# Patient Record
Sex: FEMALE | Race: WHITE
[De-identification: names, ages, dates, MRNs, and addresses within clinical notes are randomized per-mention and may not be internally consistent; named-entity substitution may affect disease eponyms.]

---

## 2020-12-11 ENCOUNTER — HOSPITAL ENCOUNTER (EMERGENCY)
Dept: HOSPITAL 72 - EMR | Age: 80
Discharge: HOME | End: 2020-12-11
Payer: COMMERCIAL

## 2020-12-11 VITALS — SYSTOLIC BLOOD PRESSURE: 152 MMHG | DIASTOLIC BLOOD PRESSURE: 71 MMHG

## 2020-12-11 VITALS — BODY MASS INDEX: 24.55 KG/M2 | HEIGHT: 61 IN | WEIGHT: 130 LBS

## 2020-12-11 VITALS — DIASTOLIC BLOOD PRESSURE: 75 MMHG | SYSTOLIC BLOOD PRESSURE: 145 MMHG

## 2020-12-11 DIAGNOSIS — M54.5: ICD-10-CM

## 2020-12-11 DIAGNOSIS — G89.29: ICD-10-CM

## 2020-12-11 DIAGNOSIS — J44.9: ICD-10-CM

## 2020-12-11 DIAGNOSIS — M51.36: ICD-10-CM

## 2020-12-11 DIAGNOSIS — I11.9: ICD-10-CM

## 2020-12-11 DIAGNOSIS — Z85.9: ICD-10-CM

## 2020-12-11 DIAGNOSIS — S22.080A: Primary | ICD-10-CM

## 2020-12-11 PROCEDURE — 99284 EMERGENCY DEPT VISIT MOD MDM: CPT

## 2020-12-11 PROCEDURE — 72131 CT LUMBAR SPINE W/O DYE: CPT

## 2020-12-11 NOTE — EMERGENCY ROOM REPORT
History of Present Illness


General


Chief Complaint:  Back Pain-No Injury





Present Illness


HPI


80-year-old female with history of lumbar compression fractures presents to the 

emergency department complaining of 10 out of 10 severity low back pain that 

originates in the middle of her lower back progressive since yesterday.  Patient

reports she was diagnosed with compression fractures several years ago.  She 

states that her symptoms today are not consistent with those that she has had 

associated with previous findings.  Patient reports pain with any attempts to 

bend, sit up or turn.  Patient reports pain is exacerbated upon 

weightbearing/ambulating.  Patient reports some relief of her pain with laying 

flat however she reports constant dull ache is always there. Pt. is concerned 

there may be worsening of her previous injuries.   Denies numbness tingling or 

loss of sensation or gross motor movements of the extremities, incontinence of 

bowel or bladder. Denies CP, Palpitations, LOC, AMS, dizziness, Changes in 

Vision, weakness or a sudden severe headache.  She reports she sees a 

cardiologist regularly and had a normal checkup 4 weeks ago.  She denies trauma 

or fall.  She denies dysuria, hematuria or urinary frequency. She denies 

abdominal pain or tenderness. She denies neck pain/stiffness. She denies recent 

spinal procedures or hx of cancer. Denies night sweats, fevers or chills.  Pmhx 

: degenerative spine, COPD, HTN, and compression fractures with chronic pain.  

Not in pain management, and not taking opiate pain medications.


Allergies:  


Coded Allergies:  


     No Known Allergies (Unverified , 7/20/14)





COVID-19 Screening


Contact w/high risk pt:  No


Experienced COVID-19 symptoms?:  No


COVID-19 Testing performed PTA:  No





Patient History


Past Medical History:  see triage record


Past Surgical History:  none


Pertinent Family History:  none


Pregnant Now:  No


Reviewed Nursing Documentation:  PMH: Agreed; PSxH: Agreed





Nursing Documentation-PMH


Hx Cardiac Problems:  Yes - ARRYTHMIA


Hx Hypertension:  Yes


Hx COPD:  Yes


Hx Cancer:  Yes


Hx Gastrointestinal Problems:  Yes - GERD


Hx Neurological Problems:  No


Hx Seizures:  No





Review of Systems


All Other Systems:  negative except mentioned in HPI





Physical Exam





Vital Signs








  Date Time  Temp Pulse Resp B/P (MAP) Pulse Ox O2 Delivery O2 Flow Rate FiO2


 


12/11/20 14:54 98.8 100 18 150/77 (101) 98 Room Air  








Sp02 EP Interpretation:  reviewed, normal


General Appearance:  no apparent distress, alert, GCS 15, non-toxic


Head:  normocephalic, atraumatic


Eyes:  bilateral eye normal inspection, bilateral eye PERRL


ENT:  hearing grossly normal, normal voice


Neck:  full range of motion


Respiratory:  chest non-tender, lungs clear, normal breath sounds, speaking full

sentences


Cardiovascular #1:  regular rate, rhythm, no edema, normal capillary refill


Cardiovascular #2:  2+ dorsalis pedis (R), 2+ dorsalis pedis (L)


Gastrointestinal:  normal bowel sounds, non tender, soft, non-distended, no 

guarding, no pulsatile mass


Genitourinary:  normal inspection, no CVA tenderness


Musculoskeletal:  back normal, no calf tenderness, gait/station normal - with 

some pain, no need for assistance. , tender -  Pt. has TTP midline in the upper 

Lumbar spinal area and the lower portion midline. No significant paraspinal TTP,

No localized spinous process ttp. no palpable step off. , other - Decreased ROM 

and ambultation secondary to pain. no muscle weakness.


Neurologic:  alert, motor strength/tone normal, oriented x3, sensory intact, 

responsive, speech normal


Psychiatric:  judgement/insight normal


Lymphatic:  no adenopathy





Medical Decision Making


PA Attestation


Dr. Mcgowan Is my supervising Physician whom patient management has been 

discussed with.


Diagnostic Impression:  


   Primary Impression:  


   T12 compression fracture


   Qualified Codes:  S22.080A - Wedge compression fracture of t11-T12 vertebra, 

   initial encounter for closed fracture


   Additional Impressions:  


   Lumbar degenerative disc disease


   Chronic back pain


   Qualified Codes:  M54.5 - Low back pain; G89.29 - Other chronic pain


ER Course


80-year-old female with history of lumbar compression fractures presents to the 

emergency department complaining of 10 out of 10 severity low back pain that 

originates in the middle of her lower back progressive since yesterday.  Patient

reports she was diagnosed with compression fractures several years ago.  She 

states that her symptoms today are not consistent with those that she has had 

associated with previous findings.  Patient reports pain with any attempts to 

bend, sit up or turn.  Patient reports pain is exacerbated upon 

weightbearing/ambulating.  Patient reports some relief of her pain with laying 

flat however she reports constant dull ache is always there. Pt. is concerned 

there may be worsening of her previous injuries.   Denies numbness tingling or 

loss of sensation or gross motor movements of the extremities, incontinence of 

bowel or bladder. Denies CP, Palpitations, LOC, AMS, dizziness, Changes in 

Vision, weakness or a sudden severe headache.  She reports she sees a 

cardiologist regularly and had a normal checkup 4 weeks ago.  She denies trauma 

or fall.  She denies dysuria, hematuria or urinary frequency. She denies 

abdominal pain or tenderness. She denies neck pain/stiffness. She denies recent 

spinal procedures or hx of cancer. Denies night sweats, fevers or chills.  Pmhx 

: degenerative spine, COPD, HTN, and compression fractures with chronic pain.  

Not in pain management, and not taking opiate pain medications. 





Ddx considered: epidural abscess, fracture, sprain/strain, meningitis, spinal 

chord injury, sciatica, cauda equina, Pyelonephritis, renal calculi just to name

a few.  





Vital signs reviewed and are WNL during ED visit.





Pt. is afebrile with no signs of infection


No saddle anesthesia noted, No incontinence 


Neurovascular is intact


ROM is limited due to pain





ORDERS: 





-CT L-Spine No contrast: Worsening of T6 compression fracture.  No acute changes

of previous bleed diagnosed bone fragments.  New onset grade 1 spondylolisthesis

on L5/S1.








INTERVENTIONS: 


- Robaxin 750 mg PO


-Lidoderm TP


- Tylenol PO





Patient reports improvement of her pain.  She is able to ambulate she reports 

during ambulation her pain is a 2 out of 10 in severity.  Patient states 

medications given during ED visit have helped her significantly.  I discussed 

with this patient the findings of her CAT scan.  I also gave her a copy of her 

official radiology report to take with her for follow-up.  I also am giving 

patient the office information for the spinal orthopedist that saw her several 

years ago here at the hospital when she was diagnosed with her compression 

fractures.











DISCHARGE: At this time pt. is stable for d/c to home. Will provide printed 

patient care instructions, and any necessary prescriptions. Care plan and follow

up instructions have been discussed with the patient prior to discharge.





Pt. will wait in room until her son is outside to pick her up. She is ambulatory

on her own with a steady gait but requests to be taken out in a wheel chair.


CT/MRI/US Diagnostic Results


CT/MRI/US Diagnostic Results :  


   Imaging Test Ordered:  CT L-Spine No contrast


   Impression


"  IMPRESSION:


 


INTERVAL PROGRESSION OF T12 COMPRESSION FRACTURE NOW DEMONSTRATING VERTEBRAL 

PLANA


MORPHOLOGY, WITH MILD RETROPULSION OF FRAGMENTS, WHICH APPEAR STABLE FROM PRIOR


EXAMINATION.


 


MULTILEVEL DISCOGENIC DEGENERATIVE DISEASE WORSE AT THE LEVEL OF L5-S1, WHERE 

THERE


IS LIKELY MODERATE SPINAL CANAL STENOSIS AND MODERATE BILATERAL FORAMINA 

NARROWING.


 


NEW GRADE 1 ANTEROLISTHESIS OF L5 ON S1 WITHOUT ASSOCIATED PARS FRACTURE."   

--Per official radiology report- Please see report for specific details.





Last Vital Signs








  Date Time  Temp Pulse Resp B/P (MAP) Pulse Ox O2 Delivery O2 Flow Rate FiO2


 


12/11/20 15:16 98.8 80 16 152/71 99 Room Air  








Disposition:  HOME, SELF-CARE


Condition:  Stable


Scripts


Lidocaine Patch* (Lidoderm Patch*) 1 Each Adh..patch


1 PATCH TOPIC DAILY, #30 PATCH


   Patch(es) may remain in place for up to 12 hours in any 24-hour


   period.


   Prov: Jaja Blackmon         12/11/20 


Acetaminophen (8Hr Arthritis Pain) 650 Mg Tablet.er


650 MG PO Q8HR, #30 TAB


   Prov: Jaja Blackmon         12/11/20 


Methocarbamol* (ROBAXIN-750*) 750 Mg Tablet


750 MG PO TID, #21 TAB 0 Refills


   Prov: Jaja Blackmon         12/11/20


Patient Instructions:  Back Pain, Adult, Spinal Compression Fracture





Additional Instructions:  


Take medications as directed. 





!!Do not drink alcohol, drive, or operate heavy machinery while taking ROBAXIN (

Muscle Relaxer)  as this may cause drowsiness. !!!








 ** Follow up with an ORTHOPEDIC SPECIALIST in 3-5 days, even if your symptoms 

have resolved. ** 





If symptoms persist MRI may be required at the discretion of your PCP or Ortho 

Specialist.  ** 





--Please review list of primary care clinics, if you do not already have a 

primary care provider who can give you an Orthopedic Referral. 





Return sooner to ED if new symptoms occur, or current symptoms become worse. 








- Please note that this Emergency Department Report was dictated using 1jia[employer] technology software, occasionally this can lead to 

erroneous entry secondary to interpretation by the dictation equipment.











Jaja Blackmon              Dec 11, 2020 15:49

## 2020-12-11 NOTE — DIAGNOSTIC IMAGING REPORT
EXAM: CT L Spine no Contrast

 

CLINICAL HISTORY: Reason For Exam: Back pain.

 

TECHNIQUE:  Axial images obtained through the lumbosacral spine with subsequent

sagittal and coronal reformat images.

 

All CT scans at this facility are performed using dose modulation techniques as

appropriate to a performed exam including the following: automated exposure control

with  adjustment of the mA and/or kV according to patient size.

 

RADIATION DOSE:  

CTDIvol:   9.1 mGy

DLP:   303.9 mGy-cm

Dose information generated by the CT scanner is available in PACS.

 

COMPARISON:  CT lumbosacral spine dated 2/2/2016

 

FINDINGS:  

 

Lumbar lordosis is maintained. Interval worsening of previously described T12

compression fracture, now demonstrating vertebra plana morphology. There is mild

retropulsion of fragments at this level leading to likely moderate spinal canal

stenosis, similar to prior examination. Again demonstrated is compression fracture of

L1 with approximately 25% loss of body height, without significant change from prior

examination. 

 

Interval development of grade 1 anterolisthesis of L5 on S1 without associated pars

fracture.

 

There are multilevel discogenic degenerative changes characterized by disc space

narrowing, endplate aspect formation, and bilateral facet arthropathy at the levels

of L4-L5 and L5-S1. Discogenic disease is worse at the level of L5-S1, where there is

moderate disc bulge and likely moderate spinal canal stenosis as well as mild to

moderate bilateral foraminal narrowing. Additionally, there is likely moderate spinal

canal stenosis at the level of L4-L5.

 

Incidental note is made of moderate aortoiliac there is moderate emphysema

atherosclerotic calcification and there is moderate emphysema visualized lungs.

 

IMPRESSION:

 

INTERVAL PROGRESSION OF T12 COMPRESSION FRACTURE NOW DEMONSTRATING VERTEBRAL PLANA

MORPHOLOGY, WITH MILD RETROPULSION OF FRAGMENTS, WHICH APPEAR STABLE FROM PRIOR

EXAMINATION.

 

MULTILEVEL DISCOGENIC DEGENERATIVE DISEASE WORSE AT THE LEVEL OF L5-S1, WHERE THERE

IS LIKELY MODERATE SPINAL CANAL STENOSIS AND MODERATE BILATERAL FORAMINA NARROWING.

 

NEW GRADE 1 ANTEROLISTHESIS OF L5 ON S1 WITHOUT ASSOCIATED PARS FRACTURE.